# Patient Record
Sex: FEMALE | Race: BLACK OR AFRICAN AMERICAN | NOT HISPANIC OR LATINO | Employment: UNEMPLOYED | URBAN - METROPOLITAN AREA
[De-identification: names, ages, dates, MRNs, and addresses within clinical notes are randomized per-mention and may not be internally consistent; named-entity substitution may affect disease eponyms.]

---

## 2021-04-15 ENCOUNTER — PATIENT MESSAGE (OUTPATIENT)
Dept: RESEARCH | Facility: HOSPITAL | Age: 21
End: 2021-04-15

## 2021-07-01 ENCOUNTER — PATIENT MESSAGE (OUTPATIENT)
Dept: ADMINISTRATIVE | Facility: OTHER | Age: 21
End: 2021-07-01

## 2022-04-20 ENCOUNTER — HOSPITAL ENCOUNTER (OUTPATIENT)
Facility: HOSPITAL | Age: 22
Discharge: HOME | End: 2022-04-22
Attending: EMERGENCY MEDICINE | Admitting: EMERGENCY MEDICINE
Payer: MEDICAID

## 2022-04-20 DIAGNOSIS — T78.3XXA ANGIOEDEMA: Primary | ICD-10-CM

## 2022-04-20 LAB
B-HCG UR QL: NEGATIVE
BASOPHILS # BLD AUTO: 0.03 K/UL (ref 0–0.2)
BASOPHILS NFR BLD: 0.2 % (ref 0–1.9)
CTP QC/QA: YES
CTP QC/QA: YES
DIFFERENTIAL METHOD: ABNORMAL
EOSINOPHIL # BLD AUTO: 0 K/UL (ref 0–0.5)
EOSINOPHIL NFR BLD: 0.1 % (ref 0–8)
ERYTHROCYTE [DISTWIDTH] IN BLOOD BY AUTOMATED COUNT: 12.5 % (ref 11.5–14.5)
HCT VFR BLD AUTO: 33.5 % (ref 37–48.5)
HGB BLD-MCNC: 10.9 G/DL (ref 12–16)
IMM GRANULOCYTES # BLD AUTO: 0.05 K/UL (ref 0–0.04)
IMM GRANULOCYTES NFR BLD AUTO: 0.4 % (ref 0–0.5)
LYMPHOCYTES # BLD AUTO: 2.1 K/UL (ref 1–4.8)
LYMPHOCYTES NFR BLD: 17.3 % (ref 18–48)
MCH RBC QN AUTO: 23.6 PG (ref 27–31)
MCHC RBC AUTO-ENTMCNC: 32.5 G/DL (ref 32–36)
MCV RBC AUTO: 73 FL (ref 82–98)
MONOCYTES # BLD AUTO: 0.2 K/UL (ref 0.3–1)
MONOCYTES NFR BLD: 1.7 % (ref 4–15)
NEUTROPHILS # BLD AUTO: 9.7 K/UL (ref 1.8–7.7)
NEUTROPHILS NFR BLD: 80.3 % (ref 38–73)
NRBC BLD-RTO: 0 /100 WBC
PLATELET # BLD AUTO: 358 K/UL (ref 150–450)
PMV BLD AUTO: 9.4 FL (ref 9.2–12.9)
RBC # BLD AUTO: 4.61 M/UL (ref 4–5.4)
SARS-COV-2 RDRP RESP QL NAA+PROBE: NEGATIVE
WBC # BLD AUTO: 12.12 K/UL (ref 3.9–12.7)

## 2022-04-20 PROCEDURE — 96372 THER/PROPH/DIAG INJ SC/IM: CPT | Performed by: PHYSICIAN ASSISTANT

## 2022-04-20 PROCEDURE — 96375 TX/PRO/DX INJ NEW DRUG ADDON: CPT

## 2022-04-20 PROCEDURE — 85025 COMPLETE CBC W/AUTO DIFF WBC: CPT | Performed by: PHYSICIAN ASSISTANT

## 2022-04-20 PROCEDURE — 63600175 PHARM REV CODE 636 W HCPCS: Performed by: PHYSICIAN ASSISTANT

## 2022-04-20 PROCEDURE — 86140 C-REACTIVE PROTEIN: CPT | Performed by: PHYSICIAN ASSISTANT

## 2022-04-20 PROCEDURE — 96361 HYDRATE IV INFUSION ADD-ON: CPT

## 2022-04-20 PROCEDURE — 81025 URINE PREGNANCY TEST: CPT | Performed by: PHYSICIAN ASSISTANT

## 2022-04-20 PROCEDURE — 80053 COMPREHEN METABOLIC PANEL: CPT | Performed by: PHYSICIAN ASSISTANT

## 2022-04-20 PROCEDURE — 25000003 PHARM REV CODE 250: Performed by: PHYSICIAN ASSISTANT

## 2022-04-20 PROCEDURE — 96374 THER/PROPH/DIAG INJ IV PUSH: CPT

## 2022-04-20 PROCEDURE — 86160 COMPLEMENT ANTIGEN: CPT | Performed by: PHYSICIAN ASSISTANT

## 2022-04-20 PROCEDURE — 86160 COMPLEMENT ANTIGEN: CPT | Mod: 59 | Performed by: PHYSICIAN ASSISTANT

## 2022-04-20 PROCEDURE — U0002 COVID-19 LAB TEST NON-CDC: HCPCS | Performed by: PHYSICIAN ASSISTANT

## 2022-04-20 PROCEDURE — 99285 EMERGENCY DEPT VISIT HI MDM: CPT | Mod: 25

## 2022-04-20 PROCEDURE — 83735 ASSAY OF MAGNESIUM: CPT | Performed by: PHYSICIAN ASSISTANT

## 2022-04-20 PROCEDURE — 96376 TX/PRO/DX INJ SAME DRUG ADON: CPT

## 2022-04-20 PROCEDURE — G0378 HOSPITAL OBSERVATION PER HR: HCPCS

## 2022-04-20 RX ORDER — FAMOTIDINE 10 MG/ML
20 INJECTION INTRAVENOUS
Status: COMPLETED | OUTPATIENT
Start: 2022-04-20 | End: 2022-04-20

## 2022-04-20 RX ORDER — METHYLPREDNISOLONE SOD SUCC 125 MG
125 VIAL (EA) INJECTION ONCE
Status: DISCONTINUED | OUTPATIENT
Start: 2022-04-21 | End: 2022-04-21

## 2022-04-20 RX ORDER — DIPHENHYDRAMINE HYDROCHLORIDE 50 MG/ML
25 INJECTION INTRAMUSCULAR; INTRAVENOUS
Status: COMPLETED | OUTPATIENT
Start: 2022-04-20 | End: 2022-04-20

## 2022-04-20 RX ORDER — EPINEPHRINE 0.3 MG/.3ML
0.3 INJECTION SUBCUTANEOUS
Status: COMPLETED | OUTPATIENT
Start: 2022-04-20 | End: 2022-04-20

## 2022-04-20 RX ORDER — METHYLPREDNISOLONE SOD SUCC 125 MG
125 VIAL (EA) INJECTION
Status: COMPLETED | OUTPATIENT
Start: 2022-04-20 | End: 2022-04-20

## 2022-04-20 RX ORDER — FAMOTIDINE 10 MG/ML
40 INJECTION INTRAVENOUS ONCE
Status: DISCONTINUED | OUTPATIENT
Start: 2022-04-21 | End: 2022-04-21

## 2022-04-20 RX ADMIN — SODIUM CHLORIDE, SODIUM LACTATE, POTASSIUM CHLORIDE, AND CALCIUM CHLORIDE 1000 ML: .6; .31; .03; .02 INJECTION, SOLUTION INTRAVENOUS at 09:04

## 2022-04-20 RX ADMIN — EPINEPHRINE 0.3 MG: 0.3 INJECTION INTRAMUSCULAR at 08:04

## 2022-04-20 RX ADMIN — FAMOTIDINE 20 MG: 10 INJECTION INTRAVENOUS at 07:04

## 2022-04-20 RX ADMIN — DIPHENHYDRAMINE HYDROCHLORIDE 25 MG: 50 INJECTION INTRAMUSCULAR; INTRAVENOUS at 09:04

## 2022-04-20 RX ADMIN — METHYLPREDNISOLONE SODIUM SUCCINATE 125 MG: 125 INJECTION, POWDER, FOR SOLUTION INTRAMUSCULAR; INTRAVENOUS at 07:04

## 2022-04-20 RX ADMIN — DIPHENHYDRAMINE HYDROCHLORIDE 25 MG: 50 INJECTION INTRAMUSCULAR; INTRAVENOUS at 07:04

## 2022-04-21 LAB
ABO + RH BLD: NORMAL
ALBUMIN SERPL BCP-MCNC: 3.3 G/DL (ref 3.5–5.2)
ALP SERPL-CCNC: 39 U/L (ref 55–135)
ALT SERPL W/O P-5'-P-CCNC: 8 U/L (ref 10–44)
ANION GAP SERPL CALC-SCNC: 9 MMOL/L (ref 8–16)
AST SERPL-CCNC: 14 U/L (ref 10–40)
BILIRUB SERPL-MCNC: 0.2 MG/DL (ref 0.1–1)
BLD GP AB SCN CELLS X3 SERPL QL: NORMAL
BLD PROD TYP BPU: NORMAL
BLD PROD TYP BPU: NORMAL
BLOOD UNIT EXPIRATION DATE: NORMAL
BLOOD UNIT EXPIRATION DATE: NORMAL
BLOOD UNIT TYPE CODE: 5100
BLOOD UNIT TYPE CODE: 5100
BLOOD UNIT TYPE: NORMAL
BLOOD UNIT TYPE: NORMAL
BUN SERPL-MCNC: 10 MG/DL (ref 6–20)
C4 SERPL-MCNC: 31 MG/DL (ref 11–44)
CALCIUM SERPL-MCNC: 9.2 MG/DL (ref 8.7–10.5)
CHLORIDE SERPL-SCNC: 108 MMOL/L (ref 95–110)
CO2 SERPL-SCNC: 20 MMOL/L (ref 23–29)
CODING SYSTEM: NORMAL
CODING SYSTEM: NORMAL
CREAT SERPL-MCNC: 1.1 MG/DL (ref 0.5–1.4)
CRP SERPL-MCNC: 2.2 MG/L (ref 0–8.2)
DISPENSE STATUS: NORMAL
DISPENSE STATUS: NORMAL
ERYTHROCYTE [SEDIMENTATION RATE] IN BLOOD BY WESTERGREN METHOD: 19 MM/HR (ref 0–20)
EST. GFR  (AFRICAN AMERICAN): >60 ML/MIN/1.73 M^2
EST. GFR  (NON AFRICAN AMERICAN): >60 ML/MIN/1.73 M^2
GLUCOSE SERPL-MCNC: 160 MG/DL (ref 70–110)
MAGNESIUM SERPL-MCNC: 1.6 MG/DL (ref 1.6–2.6)
NUM UNITS TRANS FFP: NORMAL
NUM UNITS TRANS FFP: NORMAL
POTASSIUM SERPL-SCNC: 3.9 MMOL/L (ref 3.5–5.1)
PROT SERPL-MCNC: 8.1 G/DL (ref 6–8.4)
SODIUM SERPL-SCNC: 137 MMOL/L (ref 136–145)

## 2022-04-21 PROCEDURE — G0378 HOSPITAL OBSERVATION PER HR: HCPCS

## 2022-04-21 PROCEDURE — P9017 PLASMA 1 DONOR FRZ W/IN 8 HR: HCPCS | Performed by: EMERGENCY MEDICINE

## 2022-04-21 PROCEDURE — 25000003 PHARM REV CODE 250: Performed by: HOSPITALIST

## 2022-04-21 PROCEDURE — 85652 RBC SED RATE AUTOMATED: CPT | Performed by: PHYSICIAN ASSISTANT

## 2022-04-21 PROCEDURE — 86850 RBC ANTIBODY SCREEN: CPT | Performed by: EMERGENCY MEDICINE

## 2022-04-21 PROCEDURE — 25000003 PHARM REV CODE 250: Performed by: EMERGENCY MEDICINE

## 2022-04-21 PROCEDURE — 94761 N-INVAS EAR/PLS OXIMETRY MLT: CPT

## 2022-04-21 PROCEDURE — 96376 TX/PRO/DX INJ SAME DRUG ADON: CPT

## 2022-04-21 PROCEDURE — 63600175 PHARM REV CODE 636 W HCPCS: Performed by: EMERGENCY MEDICINE

## 2022-04-21 PROCEDURE — 36430 TRANSFUSION BLD/BLD COMPNT: CPT

## 2022-04-21 PROCEDURE — 86900 BLOOD TYPING SEROLOGIC ABO: CPT | Performed by: EMERGENCY MEDICINE

## 2022-04-21 RX ORDER — ACETAMINOPHEN 325 MG/1
650 TABLET ORAL EVERY 4 HOURS PRN
Status: DISCONTINUED | OUTPATIENT
Start: 2022-04-21 | End: 2022-04-21

## 2022-04-21 RX ORDER — HYDROCODONE BITARTRATE AND ACETAMINOPHEN 500; 5 MG/1; MG/1
TABLET ORAL
Status: DISCONTINUED | OUTPATIENT
Start: 2022-04-21 | End: 2022-04-21

## 2022-04-21 RX ORDER — DIPHENHYDRAMINE HCL 25 MG
25 CAPSULE ORAL EVERY 8 HOURS
Status: DISCONTINUED | OUTPATIENT
Start: 2022-04-21 | End: 2022-04-22 | Stop reason: HOSPADM

## 2022-04-21 RX ORDER — METHYLPREDNISOLONE SOD SUCC 125 MG
125 VIAL (EA) INJECTION EVERY 8 HOURS
Status: DISCONTINUED | OUTPATIENT
Start: 2022-04-21 | End: 2022-04-21

## 2022-04-21 RX ORDER — PREDNISONE 20 MG/1
40 TABLET ORAL DAILY
Status: DISCONTINUED | OUTPATIENT
Start: 2022-04-22 | End: 2022-04-22 | Stop reason: HOSPADM

## 2022-04-21 RX ORDER — ONDANSETRON 2 MG/ML
4 INJECTION INTRAMUSCULAR; INTRAVENOUS EVERY 8 HOURS PRN
Status: DISCONTINUED | OUTPATIENT
Start: 2022-04-21 | End: 2022-04-22 | Stop reason: HOSPADM

## 2022-04-21 RX ORDER — TALC
6 POWDER (GRAM) TOPICAL NIGHTLY PRN
Status: DISCONTINUED | OUTPATIENT
Start: 2022-04-21 | End: 2022-04-22 | Stop reason: HOSPADM

## 2022-04-21 RX ORDER — ACETAMINOPHEN 325 MG/1
650 TABLET ORAL EVERY 4 HOURS PRN
Status: DISCONTINUED | OUTPATIENT
Start: 2022-04-21 | End: 2022-04-22 | Stop reason: HOSPADM

## 2022-04-21 RX ORDER — SODIUM CHLORIDE 0.9 % (FLUSH) 0.9 %
10 SYRINGE (ML) INJECTION
Status: DISCONTINUED | OUTPATIENT
Start: 2022-04-21 | End: 2022-04-22 | Stop reason: HOSPADM

## 2022-04-21 RX ORDER — FAMOTIDINE 10 MG/ML
40 INJECTION INTRAVENOUS DAILY
Status: DISCONTINUED | OUTPATIENT
Start: 2022-04-22 | End: 2022-04-22 | Stop reason: HOSPADM

## 2022-04-21 RX ADMIN — DIPHENHYDRAMINE HYDROCHLORIDE 25 MG: 25 CAPSULE ORAL at 02:04

## 2022-04-21 RX ADMIN — METHYLPREDNISOLONE SODIUM SUCCINATE 125 MG: 125 INJECTION, POWDER, FOR SOLUTION INTRAMUSCULAR; INTRAVENOUS at 02:04

## 2022-04-21 RX ADMIN — METHYLPREDNISOLONE SODIUM SUCCINATE 125 MG: 125 INJECTION, POWDER, FOR SOLUTION INTRAMUSCULAR; INTRAVENOUS at 05:04

## 2022-04-21 RX ADMIN — ACETAMINOPHEN 650 MG: 325 TABLET ORAL at 11:04

## 2022-04-21 NOTE — CARE UPDATE
Second bag of FFP not spiked upon start of shift.  Verified information by two RNs and started infusion at 0736.

## 2022-04-21 NOTE — ED NOTES
Lip swelling has increased. Denies shortness of breath or trouble swallowing. No airway compromise. Provider notified and at bedside.

## 2022-04-21 NOTE — PLAN OF CARE
Patient continues with swollen upper lip---feels lip has not changed in size since beginning of shift.   Patient is able to swallow without difficulty.   Does complain of headache relieved with tylenol.   Family at bedside.

## 2022-04-21 NOTE — H&P
[facility]  [facility] Medicine  History & Physical    Patient Name: Henna Ruiz  MRN: [id number]  Patient Class: OP- Observation  Admission Date: 4/20/2022  Attending Physician: Piyush Anderson MD  Primary Care Provider: Dulce Owens MD (Inactive)         Patient information was obtained from patient, past medical records and ER records.     Subjective:     Principal Problem:Angioedema    Chief Complaint:       Chief Complaint   Patient presents with    Facial Swelling     Pt presents to ED c/o right sided lip swelling x 2 hr pta.  Associated symptom tingling and numbness.  Denies any difficult swallowing, throat swelling, new medication, facial product, sob or cp.  Pain 0/10.  Provider seen pt in triage.         HPI: 21 y.o. female PMHx iron deficiency anemia on ferrous sulfate and recently completed dose of fluconazole presents with lip swelling that started all of sudden at 5:30pm.  She states she had a pretty routine day and did not eat anything different or out of the ordinary.  She states she completed a course of fluconazole 2 days ago.  There has been no soaps, perfumes, detergent or environment. She is not on antihypertensives.  She denies trauma, rash, SOB, dizziness.  She states that her heart started to race after being given epinephrine in the emergency department.    Per report, patient has a grandfather who had similar symptoms years ago.    ED course: Patient had the following  Medications   famotidine (PF) injection 20 mg (20 mg Intravenous Given 4/20/22 1947)   diphenhydrAMINE injection 25 mg (25 mg Intravenous Given 4/20/22 1948)   methylPREDNISolone sodium succinate injection 125 mg (125 mg Intravenous Given 4/20/22 1949)   EPINEPHrine (EPIPEN) 0.3 mg/0.3 mL pen injection 0.3 mg (0.3 mg Intramuscular Given 4/20/22 2056)   lactated ringers bolus 1,000 mL (1,000 mLs Intravenous New Bag 4/20/22 2157)   diphenhydrAMINE injection 25 mg (25 mg Intravenous Given 4/20/22 2157)   She  "has noted worsening swelling since arriving in the ED. Denies hives or SOB. Anesthesia evaluated her and did not believe she needed to be immediately intubated.      Admitted to Hospital Medicine for further evaluation    Past Medical History:   Diagnosis Date    Bronchitis     Eczema     Sickle cell trait      No past surgical history on file.  Social History     Tobacco Use    Smoking status: Never Smoker    Smokeless tobacco: Never Used   Substance Use Topics    Alcohol use: No    Drug use: No     No family history on file.  Review of patient's allergies indicates:  No Known Allergies    MEDICATION (includes but may not be exclusive to:)    ferrous gluconate (FERGON) 325 MG Tab, Take 1 tablet (325 mg total) by mouth once daily., Disp: 90 tablet, Rfl: 6  fluconazole       Review of Systems as per HPI  Review of Systems   Respiratory: Negative for cough, hemoptysis, sputum production, shortness of breath and wheezing.    Cardiovascular: Negative for chest pain, orthopnea, claudication, leg swelling and PND.   Gastrointestinal: Negative for abdominal pain, constipation, diarrhea, nausea and vomiting.   Musculoskeletal: Negative.    Skin: Negative.    Neurological: Negative for dizziness, tingling, tremors, sensory change, speech change, focal weakness, seizures, loss of consciousness, weakness and headaches.   Endo/Heme/Allergies: Negative for environmental allergies.   All other systems reviewed and are negative.        Physical Exam     ED Triage Vitals [04/20/22 1935]   Enc Vitals Group      /84      Pulse 82      Resp 17      Temp 99.1 °F (37.3 °C)      Temp src Oral      SpO2 100 %      Weight 138 lb      Height 5' 5"      Head Circumference       Peak Flow       Pain Score       Pain Loc       Pain Edu?       Excl. in GC?      Vitals:    04/20/22 1935 04/20/22 2132   BP: 135/84 123/62   BP Location: Right arm    Patient Position: Sitting    Pulse: 82 103   Resp: 17    Temp: 99.1 °F (37.3 °C)  " "  TempSrc: Oral    SpO2: 100% 100%   Weight: 62.6 kg (138 lb)    Height: 5' 5" (1.651 m)        Vital signs and nursing assessment noted: vitals relatively benign    GEN:   NAD, A & Ox3, atraumatic, well appearing, nontoxic appearing  HEENT:  PERRLA, EOMI, moist membranes, nl conjunctiva, no scleral icterus, no nystagmus, no nodes/nodules, soft, supple, FROM, no trachial deviation, nexus negative  Physical Exam  HENT:      Mouth/Throat:      Mouth: Angioedema present.         CV:   RRR no m/r/g, 2+ radial pulses, <2sec cap refill, no obvious JVD  RESP:  CTA B, no w/r/r, equal and bilateral chest rise, no respiratory distress  ABD:   soft, Nontender, Nondistended, +BS, no guarding/rebound  :   Deferred  BACK:  FROM, no midline tenderness, no paraspinal tenderness  EXT:   FROM, BEE x 4, no edema, no swelling, no calf tenderness, no bony tenderness, no warmth or redness, no crepitus, no obvious deformity  LYMPH:  no gross adenopathy  NEURO:  GCS 15, CN II-XII grossly intact, no obvious motor/sensory deficit, no tremor, negative Romberg,  nl gait/coordination  PSYCH:   no SI/HI, no anxiety, nl mood/affect, nl judgement/thought process  SKIN:  Warm, dry, intact, no rashes/lesions or masses, nl color, no pallor       Tests   Significant Labs and/or Imaging: I have reviewed all pertinent results/findings within the past 24 hours.  Labs Reviewed   CBC W/ AUTO DIFFERENTIAL - Abnormal; Notable for the following components:       Result Value    Hemoglobin 10.9 (*)     Hematocrit 33.5 (*)     MCV 73 (*)     MCH 23.6 (*)     Gran # (ANC) 9.7 (*)     Immature Grans (Abs) 0.05 (*)     Mono # 0.2 (*)     Gran % 80.3 (*)     Lymph % 17.3 (*)     Mono % 1.7 (*)     All other components within normal limits   COMPREHENSIVE METABOLIC PANEL   C1 ESTERASE INHIBITOR PANEL   MAGNESIUM   C4 COMPLEMENT   C-REACTIVE PROTEIN   SEDIMENTATION RATE   POCT URINE PREGNANCY   SARS-COV-2 RDRP GENE   TYPE & SCREEN     No orders to display     No " results found for this or any previous visit.      Assessment/Plan:     * Angioedema  Isolated angioedema without immediate evidence of airway compromise  Angioedema of the lips or mouth sometimes spreads to involve the throat, and frequent monitoring of airway patency is critical throughout treatment.   --  C-reactive protein (CRP), erythrocyte sedimentation rate (ESR), levels of the complement protein C4 and type/screen ordered  --- Consider FFP  --- Patient should be admitted for hospital observation services under the care of Dr. Anderson. Observe until there are clear signs of improvement to ensure that the angioedema is not the beginning of a more generalized allergic reaction that is still in evolution.           Iron deficiency anemia  Monitor H/H      VTE Risk Mitigation (From admission, onward)         Ordered     IP VTE LOW RISK PATIENT  Once         04/21/22 0017                 Patient placed on continuous cardiac monitor, automatic blood pressure cuff and continuous pulse oximeter.  Critical care was necessary to treat or prevent imminent or life-threatening deterioration.   Critical care time: 31 min  Time spent at the bedside, reviewing test results, discussing the case with staff and/or consult(s), documenting the medical record   The time involved in the performance of separately reportable procedures was not counted toward critical care time.        David Roca MD  Department of Hospital Medicine   [facility] - Emergency Dept

## 2022-04-21 NOTE — PROVIDER TRANSFER
Transfer Note    20 y/o female presented with lip swelling.  Significant swelling of upper lip consistent with angioedema.  Started on steroids, famotidine and benadryl.  FFP also given by [facility].  Angioedema possibly secondary to Diflucan.  Upper lip still significantly swollen, but patient with no airway compromise, sats of 100% on RA and tolerating diet.  Would monitor overnight and hopefully home tomorrow.  Needs Allergy referral upon discharge.

## 2022-04-21 NOTE — ASSESSMENT & PLAN NOTE
Significant swelling of upper lip, but no airway compromise.  No trouble swallowing.  Hemodynamically stable and doing well on RA.  Possibly secondary to Diflucan patient had over the weekend for yeast infection.  Will place Diflucan on allergy list.  Needs referral to Allergist.  Started on steroids, benadryl and famotidine.  Would monitor overnight and hopefully home tomorrow.

## 2022-04-21 NOTE — PROGRESS NOTES
[facility] Medicine  Progress Note    Patient Name: Henna Ruiz  MRN: [id number]  Patient Class: OP- Observation   Admission Date: 4/20/2022  Length of Stay: 0 days  Attending Physician: Jose Guadalupe Anderson MD  Primary Care Provider: Dulce Owens MD (Inactive)        Subjective:     Principal Problem:Angioedema        HPI:  No notes on file    Overview/Hospital Course:  20 y/o female presented with lip swelling.  Significant swelling of upper lip consistent with angioedema.  Started on steroids, famotidine and benadryl.  Angioedema possibly secondary to Diflucan.      Interval History: Still with significant swelling of upper lip, but no trouble breathing or swallowing.    Review of Systems   HENT:  Negative for ear discharge and ear pain.    Eyes:  Negative for discharge and itching.   Endocrine: Negative for cold intolerance and heat intolerance.   Neurological:  Negative for seizures and syncope.   Objective:     Vital Signs (Most Recent):  Temp: 98.1 °F (36.7 °C) (04/21/22 0945)  Pulse: 100 (04/21/22 1300)  Resp: 19 (04/21/22 1300)  BP: 106/60 (04/21/22 1300)  SpO2: 100 % (04/21/22 1300) Vital Signs (24h Range):  Temp:  [98.1 °F (36.7 °C)-99.1 °F (37.3 °C)] 98.1 °F (36.7 °C)  Pulse:  [] 100  Resp:  [17-39] 19  SpO2:  [93 %-100 %] 100 %  BP: ()/(51-84) 106/60     Weight: 63.5 kg (139 lb 15.9 oz)  Body mass index is 23.3 kg/m².    Intake/Output Summary (Last 24 hours) at 4/21/2022 1325  Last data filed at 4/21/2022 0945  Gross per 24 hour   Intake 458 ml   Output --   Net 458 ml      Physical Exam  Constitutional:       Appearance: She is ill-appearing. She is not diaphoretic.   HENT:      Head: Normocephalic and atraumatic.      Mouth/Throat:      Comments: Significant swelling of upper lip.  No tongue swelling.  Eyes:      General:         Right eye: No discharge.         Left eye: No discharge.      Conjunctiva/sclera: Conjunctivae normal.   Cardiovascular:      Rate and  Rhythm: Normal rate and regular rhythm.   Pulmonary:      Breath sounds: Normal breath sounds. No wheezing.   Abdominal:      General: Bowel sounds are normal.      Palpations: Abdomen is soft.   Musculoskeletal:         General: No deformity or signs of injury.   Skin:     General: Skin is warm and dry.   Neurological:      Mental Status: She is oriented to person, place, and time.      Cranial Nerves: No cranial nerve deficit.       Significant Labs: All pertinent labs within the past 24 hours have been reviewed.  BMP:   Recent Labs   Lab 04/20/22  2200   *      K 3.9      CO2 20*   BUN 10   CREATININE 1.1   CALCIUM 9.2   MG 1.6     CBC:   Recent Labs   Lab 04/20/22  2200   WBC 12.12   HGB 10.9*   HCT 33.5*          Significant Imaging: I have reviewed all pertinent imaging results/findings within the past 24 hours.      Assessment/Plan:      * Angioedema  Significant swelling of upper lip, but no airway compromise.  No trouble swallowing.  Hemodynamically stable and doing well on RA.  Possibly secondary to Diflucan patient had over the weekend for yeast infection.  Will place Diflucan on allergy list.  Needs referral to Allergist.  Started on steroids, benadryl and famotidine.  Would monitor overnight and hopefully home tomorrow.          Iron deficiency anemia  No evidence of bleeding.  Stable.        VTE Risk Mitigation (From admission, onward)         Ordered     IP VTE LOW RISK PATIENT  Once         04/21/22 0017                Discharge Planning   SANDY:      Code Status: Full Code   Is the patient medically ready for discharge?:     Reason for patient still in hospital (select all that apply): Patient trending condition  Discharge Plan A: Home            Jose Guadalupe Anderson MD  Department of Hospital Medicine   [facility] - [place] - Intensive Care

## 2022-04-21 NOTE — PLAN OF CARE
[place] - Intensive Care  Discharge Assessment    Primary Care Provider: Dulce Owens MD (Inactive)     Discharge Assessment (most recent)       BRIEF DISCHARGE ASSESSMENT - 04/21/22 1033          Discharge Planning    Assessment Type Discharge Planning Brief Assessment (P)      Resource/Environmental Concerns none (P)      Support Systems Parent;Family members (P)      Assistance Needed None (P)      Equipment Currently Used at Home none (P)      Current Living Arrangements home/apartment/condo (P)      Patient/Family Anticipates Transition to home (P)      Patient/Family Anticipated Services at Transition none (P)      DME Needed Upon Discharge  none (P)      Discharge Plan A Home (P)      Discharge Plan B Home (P)

## 2022-04-21 NOTE — SUBJECTIVE & OBJECTIVE
Interval History: Still with significant swelling of upper lip, but no trouble breathing or swallowing.    Review of Systems   HENT:  Negative for ear discharge and ear pain.    Eyes:  Negative for discharge and itching.   Endocrine: Negative for cold intolerance and heat intolerance.   Neurological:  Negative for seizures and syncope.   Objective:     Vital Signs (Most Recent):  Temp: 98.1 °F (36.7 °C) (04/21/22 0945)  Pulse: 100 (04/21/22 1300)  Resp: 19 (04/21/22 1300)  BP: 106/60 (04/21/22 1300)  SpO2: 100 % (04/21/22 1300) Vital Signs (24h Range):  Temp:  [98.1 °F (36.7 °C)-99.1 °F (37.3 °C)] 98.1 °F (36.7 °C)  Pulse:  [] 100  Resp:  [17-39] 19  SpO2:  [93 %-100 %] 100 %  BP: ()/(51-84) 106/60     Weight: 63.5 kg (139 lb 15.9 oz)  Body mass index is 23.3 kg/m².    Intake/Output Summary (Last 24 hours) at 4/21/2022 1325  Last data filed at 4/21/2022 0945  Gross per 24 hour   Intake 458 ml   Output --   Net 458 ml      Physical Exam  Constitutional:       Appearance: She is ill-appearing. She is not diaphoretic.   HENT:      Head: Normocephalic and atraumatic.      Mouth/Throat:      Comments: Significant swelling of upper lip.  No tongue swelling.  Eyes:      General:         Right eye: No discharge.         Left eye: No discharge.      Conjunctiva/sclera: Conjunctivae normal.   Cardiovascular:      Rate and Rhythm: Normal rate and regular rhythm.   Pulmonary:      Breath sounds: Normal breath sounds. No wheezing.   Abdominal:      General: Bowel sounds are normal.      Palpations: Abdomen is soft.   Musculoskeletal:         General: No deformity or signs of injury.   Skin:     General: Skin is warm and dry.   Neurological:      Mental Status: She is oriented to person, place, and time.      Cranial Nerves: No cranial nerve deficit.       Significant Labs: All pertinent labs within the past 24 hours have been reviewed.  BMP:   Recent Labs   Lab 04/20/22  2200   *      K 3.9       CO2 20*   BUN 10   CREATININE 1.1   CALCIUM 9.2   MG 1.6     CBC:   Recent Labs   Lab 04/20/22  2200   WBC 12.12   HGB 10.9*   HCT 33.5*          Significant Imaging: I have reviewed all pertinent imaging results/findings within the past 24 hours.

## 2022-04-21 NOTE — NURSING
Pt top lip noticiably larger in size.  at bedside to assess patient. Plan to transfuse FFP. Blood consent signed.

## 2022-04-21 NOTE — PROGRESS NOTES
[facility] Progress NOTE      Admit Date: 2022  LOS: 0  Code Status: Full Code   Date of Consult: 2022  : 2000  Age: 21 y.o.  Weight:   Wt Readings from Last 1 Encounters:   22 63.5 kg (139 lb 15.9 oz)     Sex: female  Bed: W270/W270 A:   MRN: [id number]  Attending Physician: Jose Guadalupe Anderson MD  Primary Service: Networked reference to record PCT              Called by RN for increased size of upper lip.  Patient denies HIVs or SOB. Originally admitted for Angioedema, with the following known comorbidities:   Past Medical History:   Diagnosis Date    Bronchitis     Eczema     Sickle cell trait        PHYSICAL EXAM  Vital Signs (Most Recent):  Temp: 98.9 °F (37.2 °C) (22 0147)  Pulse: 80 (22 0300)  Resp: 19 (22 0300)  BP: 107/66 (22 0300)  SpO2: 100 % (22 0300) Vital Signs (24h Range):  Temp:  [98.8 °F (37.1 °C)-99.1 °F (37.3 °C)] 98.9 °F (37.2 °C)  Pulse:  [] 80  Resp:  [17-21] 19  SpO2:  [99 %-100 %] 100 %  BP: (107-135)/(62-84) 107/66      Physical Exam  HENT:      Mouth/Throat:      Mouth: Angioedema (more prominent upper lip) present.   Pulmonary:      Effort: No tachypnea, bradypnea, accessory muscle usage, prolonged expiration, respiratory distress or retractions.   Skin:     Coloration: Skin is not ashen, cyanotic, jaundiced, mottled, pale or sallow.      Findings: Rash is not urticarial.         Tests      Latest Reference Range & Units 22 22:00   Sodium 136 - 145 mmol/L 137   Potassium 3.5 - 5.1 mmol/L 3.9   Chloride 95 - 110 mmol/L 108   CO2 23 - 29 mmol/L 20 (L)   Anion Gap 8 - 16 mmol/L 9   BUN 6 - 20 mg/dL 10   Creatinine 0.5 - 1.4 mg/dL 1.1   EGFR if non African American >60 mL/min/1.73 m^2 >60 [1]   EGFR if African American >60 mL/min/1.73 m^2 >60   Glucose 70 - 110 mg/dL 160 (H)   Calcium 8.7 - 10.5 mg/dL 9.2   Magnesium 1.6 - 2.6 mg/dL 1.6   Alkaline Phosphatase 55 - 135 U/L 39 (L)   PROTEIN TOTAL 6.0 - 8.4 g/dL 8.1   Albumin  3.5 - 5.2 g/dL 3.3 (L)   BILIRUBIN TOTAL 0.1 - 1.0 mg/dL 0.2 [2]   AST 10 - 40 U/L 14   ALT 10 - 44 U/L 8 (L)   CRP 0.0 - 8.2 mg/L 2.2      Latest Reference Range & Units 04/21/22 01:03   Sed Rate 0 - 20 mm/Hr 19     X-Ray Chest AP Portable   Final Result      No radiographic evidence of acute intrathoracic process on this single view.         Electronically signed by: Yo Cummings MD   Date:    04/21/2022   Time:    03:12            ASSESSMENT/PLAN:   Lip swelling more likely angioedema than allergic reaction or trauma    Worsening symptoms despite antihistamine, steriods and epi.    FFP ordered.    Patient placed on continuous cardiac monitor, automatic blood pressure cuff and continuous pulse oximeter.      Will continue frequent clinical reassessment including vital signs

## 2022-04-21 NOTE — EICU
I reviewed the patient through the camera in    Review the chart talked with the nurse   the patient had sudden swelling of her upper lip only the lower lip is not affected patient looks very comfortable at the time being has no difficulty of breathing or any distress she is under close observation she was given steroid and famotidine will continue very close observation    I will order chest x-ray for completion sake    ?  Angioedema

## 2022-04-21 NOTE — ASSESSMENT & PLAN NOTE
Isolated angioedema without immediate evidence of airway compromise  Angioedema of the lips or mouth sometimes spreads to involve the throat, and frequent monitoring of airway patency is critical throughout treatment.   --  C-reactive protein (CRP), erythrocyte sedimentation rate (ESR), levels of the complement protein C4 and type/screen ordered  --- Consider FFP  --- Patient should be admitted for hospital observation services under the care of Dr. Anderson. Observe until there are clear signs of improvement to ensure that the angioedema is not the beginning of a more generalized allergic reaction that is still in evolution.

## 2022-04-21 NOTE — NURSING
Patient in [id number]. Connected to CCM, NSR on monitor, VSS, afebrile. Localized swelling to top lip. Pt states her lip has gotten bigger since being in ER. No tongue swelling noted. Pt in NAD at this time. Mother at bedside. Will continue to closely monitor.

## 2022-04-21 NOTE — CARE UPDATE
[facility]  Nurses Note -- 4 Eyes      4/21/2022       Skin assessed on: Q Shift      [x] No Pressure Injuries Present    []Prevention Measures Documented    [] Yes LDA Previously documented     [] Yes New Pressure Injury Discovered   [] LDA Added      Attending RN:  Chela Walton, RN     Second RN:  Christin Charlton RN

## 2022-04-21 NOTE — ED NOTES
Pt presents to ED secondary to right sided lip swelling x 2 hr. Associated symptom is tingling and numbness. Denies any difficult swallowing, throat swelling, new medication, facial product, sob or cp. Pain 0/10. Denies taking meds pta. NAD noted.

## 2022-04-21 NOTE — ED PROVIDER NOTES
Encounter Date: 4/20/2022       History     Chief Complaint   Patient presents with    Facial Swelling     Pt presents to ED c/o right sided lip swelling x 2 hr pta.  Associated symptom tingling and numbness.  Denies any difficult swallowing, throat swelling, new medication, facial product, sob or cp.  Pain 0/10.  Provider seen pt in triage.       20yo F with acute onset L upper lip swelling, numbness approx 5:30pm today.    No hx anaphylaxis. No hx similar allergic reactions.  No tongue swelling.  No odynophagia or dysphagia.  No chest tightness, no shortness of breath, no nausea vomiting, no lightheadedness.  No facial rash.  No trauma.  No neck pain or stiffness.  Denies any rash.    Not on lisinopril.  No new medications.  No new known exposures.  No meds taken prior to arrival.    Possible  family history of angioedema.    PMH:  LUH  Sickle cell trait    Rx: contraception?        Review of patient's allergies indicates:  No Known Allergies  Past Medical History:   Diagnosis Date    Bronchitis     Eczema     Sickle cell trait      No past surgical history on file.  No family history on file.  Social History     Tobacco Use    Smoking status: Never Smoker    Smokeless tobacco: Never Used   Substance Use Topics    Alcohol use: No    Drug use: No     Review of Systems   Constitutional: Negative for fever.   HENT: Positive for facial swelling. Negative for sore throat and trouble swallowing.    Respiratory: Negative for chest tightness and shortness of breath.    Gastrointestinal: Negative for nausea and vomiting.   Musculoskeletal: Negative for neck pain and neck stiffness.   Neurological: Negative for light-headedness.   Hematological: Negative for adenopathy.       Physical Exam     Initial Vitals [04/20/22 1935]   BP Pulse Resp Temp SpO2   135/84 82 17 99.1 °F (37.3 °C) 100 %      MAP       --         Physical Exam    Nursing note and vitals reviewed.  Constitutional: She appears well-developed and  well-nourished. She is not diaphoretic. No distress.   HENT:   Head: Normocephalic and atraumatic.   L upper lip edema, nontender. No overlying facial rash. No intraoral edema. Airway widely patent. Mucous membranes moist. Normal phonation.   Neck: Neck supple.   No stridor.   Normal range of motion.  Pulmonary/Chest: No respiratory distress.   Musculoskeletal:      Cervical back: Normal range of motion and neck supple.     Neurological: She is alert and oriented to person, place, and time. GCS score is 15. GCS eye subscore is 4. GCS verbal subscore is 5. GCS motor subscore is 6.   Skin: Skin is warm. Capillary refill takes less than 2 seconds.   Psychiatric: She has a normal mood and affect. Thought content normal.         ED Course   Procedures  Labs Reviewed   CBC W/ AUTO DIFFERENTIAL - Abnormal; Notable for the following components:       Result Value    Hemoglobin 10.9 (*)     Hematocrit 33.5 (*)     MCV 73 (*)     MCH 23.6 (*)     Gran # (ANC) 9.7 (*)     Immature Grans (Abs) 0.05 (*)     Mono # 0.2 (*)     Gran % 80.3 (*)     Lymph % 17.3 (*)     Mono % 1.7 (*)     All other components within normal limits   COMPREHENSIVE METABOLIC PANEL   C1 ESTERASE INHIBITOR PANEL   MAGNESIUM   C4 COMPLEMENT   POCT URINE PREGNANCY   SARS-COV-2 RDRP GENE          Imaging Results    None          Medications   famotidine (PF) injection 20 mg (20 mg Intravenous Given 4/20/22 1947)   diphenhydrAMINE injection 25 mg (25 mg Intravenous Given 4/20/22 1948)   methylPREDNISolone sodium succinate injection 125 mg (125 mg Intravenous Given 4/20/22 1949)   EPINEPHrine (EPIPEN) 0.3 mg/0.3 mL pen injection 0.3 mg (0.3 mg Intramuscular Given 4/20/22 2056)   lactated ringers bolus 1,000 mL (1,000 mLs Intravenous New Bag 4/20/22 2157)   diphenhydrAMINE injection 25 mg (25 mg Intravenous Given 4/20/22 2157)     Medical Decision Making:   Differential Diagnosis:   Angioedema, local allergic reaction, anaphylaxis, cellulitis  ED  Management:  Will give H1 H2 blockers along with systemic corticosteroids, monitor in the ED given suspicion for angioedema vs allergic reaction involving upper mouth/face.             ED Course as of 04/20/22 2326 Wed Apr 20, 2022 2046 On reevaluation, worsening L upper lip edema. Approx 1hr since H1, H2, solumedrol. Called and spoke with , she personally evaluated patient. No airway involvement at this time. Will move to main ED for cardiac monitoring given epipen.  [SM]   2150 On re-evaluation, continues with worsening left upper lip edema.  Again, airway is widely patent, no obvious intraoral involvement, no stridor, no shortness of breath, no chest tightness, vitals remain reassuring.  Likely observation overnight due to persistent swelling. [SM]   2322 On re-evaluation, continues with worsening swelling, now involves her right upper lip.  After talking with her mother, apparently patient's grandfather also had a similar facial swelling episode.  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 [SM]      ED Course User Index  [SM] Alex Reyes PA-C             Clinical Impression:   Final diagnoses:  [T78.3XXA] Angioedema (Primary)          ED Disposition Condition    Observation               Alex Reyes PA-C  04/20/22 2326

## 2022-04-21 NOTE — HOSPITAL COURSE
21 y.o. female PMHx iron deficiency anemia on ferrous sulfate admitted to observation for lip swelling consistent with angioedema. Of note, patient recently completed Diflucan for vaginal yeast infection on Sunday 04/17 and developed lip swelling 2-3 days later. Suspect angioedema due to diflucan. She was started on steroids, famotidine and benadryl. Patient was monitored closely in the ICU and stepped down to floor on 04/21.     Patient admits significant improvement and upper lip swelling however it is still present.  Able to smile and show her teeth now, unlike before.  Denies any numbness or tingling in her lips.  Denies any tongue swelling, throat swelling, or any difficulty breathing.  Denies any changes in her speech.  Mom at bedside and admits there improvement in her lip swelling. Pt denies any fever, headaches, vision changes, chest pain, shortness of breath, palpitations, abdominal pain, nausea, vomiting, or any new weaknesses. Patient's exam on discharge was as follow: Patient is alert and oriented, appears in no acute distress, heart with regular rate and rhythm, lungs clear to asculation with non-labored breathing, abdomen soft, and no new weaknesses or focal deficits seen. Patient continues with lip swelling of her upper lip, but no airway compromise. No trouble swallowing. Throat exam unremarkable. Able to visualize uvula. No tongue swelling on exam. Hemodynamically stable and doing well on RA.  Speech is very understandable.  Neck range of motion appears normal.    Patient was counseled regarding any abnormal labs, differential diagnosis, treatment options, risk-benefit, lifestyle changes, prognosis, current condition, and medications. Patient was interactive and attentive.  Patient's questions were answered in a respectful and timely manner. Patient was instructed to follow-up with PCP within 1 week and to continue taking medications as prescribed.  Referral to allergist ordered. Also, extensively  discussed the risks, benefits, and side effects of patient's medications. Discussed with patient about any medication changes. Patient verbalized understanding and agrees to treatment plan.  Patient is stable for discharge.  Patient has no other questions or concerns at this time.  ED precautions discussed with the patient.    Vital signs are stable. Ambulating without any difficulty. Tolerating p.o. intake without any nausea or vomiting. Afebrile for over 24 hours. Patient is in stable condition and has no questions or concerns. Patient will be discharge to home. New medications sent to pharmacy. Again, discussed strict ED precautions with patient and her mother.

## 2022-04-21 NOTE — ASSESSMENT & PLAN NOTE
Significant swelling of upper lip, but no airway compromise.  No trouble swallowing.  Hemodynamically stable and doing well on RA.  Possibly secondary to Diflucan patient had over the weekend for yeast infection.  Will place Diflucan on allergy list.  Needs referral to Allergist.  Started on steroids, benadryl and famotidine.  Patient also given FFP.  Would monitor overnight and hopefully home tomorrow.

## 2022-04-21 NOTE — CARE UPDATE
[facility]  ICU Shift Summary  Date: 4/21/2022      Prehospitalization: Home  Admit Date / LOS : 4/20/2022/ 0 days    Diagnosis: Angioedema    Consults:        Active: N/A       Needed: N/A     Code Status: Full Code   Advanced Directive: <no information>    LDA:  Lines/Drains/Airways     Peripheral Intravenous Line  Duration                Peripheral IV - Single Lumen 04/20/22 1947 20 G Right Antecubital <1 day              Central Lines/Site/Justification:Patient Does Not Have Central Line  Urinary Cath/Order/Justification:Patient Does Not Have Urinary Catheter    Vasopressors/Infusions:        GOALS: Volume/ Hemodynamic: N/A                     RASS: 0  alert and calm    Pain Management: PO       Pain Controlled: yes     Rhythm: NSR    Respiratory Device: Room Air                  Most Recent SBT/ SAT: N/A       MOVE Screen: PASS  ICU Liberation: not applicable    VTE Prophylaxis: Mechanical  Mobility: Ambulatory  Stress Ulcer Prophylaxis: Yes    Isolation: No active isolations    Dietary:   Current Diet Order   Procedures    Diet Adult Regular (IDDSI Level 7)      Tolerance: yes  Advancement: @ goal    I & O (24h):    Intake/Output Summary (Last 24 hours) at 4/21/2022 1629  Last data filed at 4/21/2022 1300  Gross per 24 hour   Intake 578 ml   Output --   Net 578 ml        Restraints: No    Significant Dates:  Post Op Date: N/A  Rescue Date: N/A  Imaging/ Diagnostics: N/A    Noteworthy Labs:  none    COVID Test: (--)  CBC/Anemia Labs: Coags:    Recent Labs   Lab 04/20/22  2200   WBC 12.12   HGB 10.9*   HCT 33.5*      MCV 73*   RDW 12.5    No results for input(s): PT, INR, APTT in the last 168 hours.     Chemistries:   Recent Labs   Lab 04/20/22  2200      K 3.9      CO2 20*   BUN 10   CREATININE 1.1   CALCIUM 9.2   PROT 8.1   BILITOT 0.2   ALKPHOS 39*   ALT 8*   AST 14   MG 1.6        Cardiac Enzymes: Ejection Fractions:    No results for input(s): CPK, CPKMB, MB, TROPONINI in  the last 72 hours. No results found for: EF     POCT Glucose: HbA1c:    No results for input(s): POCTGLUCOSE in the last 168 hours. No results found for: HGBA1C        ICU LOS 14h  Level of Care: OK to Transfer    Chart Check: 12 HR Done  Shift Summary/Plan for the shift: see care plan note

## 2022-04-22 VITALS
DIASTOLIC BLOOD PRESSURE: 52 MMHG | HEIGHT: 65 IN | OXYGEN SATURATION: 98 % | HEART RATE: 88 BPM | WEIGHT: 140 LBS | BODY MASS INDEX: 23.32 KG/M2 | RESPIRATION RATE: 18 BRPM | TEMPERATURE: 99 F | SYSTOLIC BLOOD PRESSURE: 91 MMHG

## 2022-04-22 PROCEDURE — 25000003 PHARM REV CODE 250: Performed by: HOSPITALIST

## 2022-04-22 PROCEDURE — G0378 HOSPITAL OBSERVATION PER HR: HCPCS

## 2022-04-22 PROCEDURE — 96376 TX/PRO/DX INJ SAME DRUG ADON: CPT

## 2022-04-22 PROCEDURE — 63600175 PHARM REV CODE 636 W HCPCS: Performed by: HOSPITALIST

## 2022-04-22 RX ORDER — EPINEPHRINE 0.3 MG/.3ML
1 INJECTION SUBCUTANEOUS ONCE AS NEEDED
Qty: 2 EACH | Refills: 0 | Status: SHIPPED | OUTPATIENT
Start: 2022-04-22 | End: 2022-04-23

## 2022-04-22 RX ORDER — DIPHENHYDRAMINE HCL 25 MG
25 TABLET ORAL EVERY 6 HOURS PRN
Qty: 20 TABLET | Refills: 0 | Status: SHIPPED | OUTPATIENT
Start: 2022-04-22 | End: 2022-04-27

## 2022-04-22 RX ORDER — CETIRIZINE HYDROCHLORIDE 10 MG/1
10 TABLET ORAL DAILY
Qty: 30 TABLET | Refills: 0 | Status: SHIPPED | OUTPATIENT
Start: 2022-04-22 | End: 2022-05-22

## 2022-04-22 RX ORDER — PREDNISONE 20 MG/1
40 TABLET ORAL DAILY
Qty: 10 TABLET | Refills: 0 | Status: SHIPPED | OUTPATIENT
Start: 2022-04-23 | End: 2022-04-28

## 2022-04-22 RX ADMIN — FAMOTIDINE 40 MG: 10 INJECTION INTRAVENOUS at 09:04

## 2022-04-22 RX ADMIN — DIPHENHYDRAMINE HYDROCHLORIDE 25 MG: 25 CAPSULE ORAL at 12:04

## 2022-04-22 RX ADMIN — DIPHENHYDRAMINE HYDROCHLORIDE 25 MG: 25 CAPSULE ORAL at 05:04

## 2022-04-22 RX ADMIN — PREDNISONE 40 MG: 20 TABLET ORAL at 09:04

## 2022-04-22 NOTE — NURSING
SBAR report called to Nurse Travis on Med Surg 4E. All questions answered. Preparing for transport. VSS. Pt in NAD at this time.

## 2022-04-22 NOTE — PLAN OF CARE
04/22/22 1113   Final Note   Assessment Type Final Discharge Note   Anticipated Discharge Disposition Home   Hospital Resources/Appts/Education Provided Community resources provided   Post-Acute Status   Post-Acute Authorization Other   Other Status No Post-Acute Service Needs   Pts nurse Sweta notified that the pt can d/c from CM standpoint

## 2022-04-22 NOTE — DISCHARGE SUMMARY
[facility]  Discharge Summary      Patient Name: Henna Ruiz  MRN: [id number]  Patient Class: OP- Observation  Admission Date: 4/20/2022  Hospital Length of Stay: 0 days  Discharge Date and Time: 4/22/2022  1:43 PM  Attending Physician: No att. providers found   Discharging Provider: Prasanna Malin DO  Primary Care Provider: Dulce Owens MD (Inactive)      HPI:   No notes on file    * No surgery found *      Hospital Course:   21 y.o. female PMHx iron deficiency anemia on ferrous sulfate admitted to observation for lip swelling consistent with angioedema. Of note, patient recently completed Diflucan for vaginal yeast infection on Sunday 04/17 and developed lip swelling 2-3 days later. Suspect angioedema due to diflucan. She was started on steroids, famotidine and benadryl. Patient was monitored closely in the ICU and stepped down to floor on 04/21.     Patient admits significant improvement and upper lip swelling however it is still present.  Able to smile and show her teeth now, unlike before.  Denies any numbness or tingling in her lips.  Denies any tongue swelling, throat swelling, or any difficulty breathing.  Denies any changes in her speech.  Mom at bedside and admits there improvement in her lip swelling. Pt denies any fever, headaches, vision changes, chest pain, shortness of breath, palpitations, abdominal pain, nausea, vomiting, or any new weaknesses. Patient's exam on discharge was as follow: Patient is alert and oriented, appears in no acute distress, heart with regular rate and rhythm, lungs clear to asculation with non-labored breathing, abdomen soft, and no new weaknesses or focal deficits seen. Patient continues with lip swelling of her upper lip, but no airway compromise. No trouble swallowing. Throat exam unremarkable. Able to visualize uvula. No tongue swelling on exam. Hemodynamically stable and doing well on RA.  Speech is very understandable.  Neck range of  motion appears normal.    Patient was counseled regarding any abnormal labs, differential diagnosis, treatment options, risk-benefit, lifestyle changes, prognosis, current condition, and medications. Patient was interactive and attentive.  Patient's questions were answered in a respectful and timely manner. Patient was instructed to follow-up with PCP within 1 week and to continue taking medications as prescribed.  Referral to allergist ordered. Also, extensively discussed the risks, benefits, and side effects of patient's medications. Discussed with patient about any medication changes. Patient verbalized understanding and agrees to treatment plan.  Patient is stable for discharge.  Patient has no other questions or concerns at this time.  ED precautions discussed with the patient.    Vital signs are stable. Ambulating without any difficulty. Tolerating p.o. intake without any nausea or vomiting. Afebrile for over 24 hours. Patient is in stable condition and has no questions or concerns. Patient will be discharge to home. New medications sent to pharmacy. Again, discussed strict ED precautions with patient and her mother.          Goals of Care Treatment Preferences:  Code Status: Full Code      Consults:     * Angioedema  Significant swelling of upper lip, but no airway compromise.  No trouble swallowing.  Hemodynamically stable and doing well on RA.  Possibly secondary to Diflucan patient had over the weekend for yeast infection.  Will place Diflucan on allergy list.  Needs referral to Allergist.  Started on steroids, benadryl and famotidine.  Patient also given FFP.  Would monitor overnight and hopefully home tomorrow.            Final Active Diagnoses:    Diagnosis Date Noted POA    PRINCIPAL PROBLEM:  Angioedema [T78.3XXA] 04/20/2022 Yes    Iron deficiency anemia [D50.9] 01/14/2014 Yes      Problems Resolved During this Admission:       Discharged Condition: stable    Disposition: Home or Self Care    Follow  Up:   Follow-up Information     [facility] [place] [place] Follow up.    Why: please call at time of discharge to schedule follow up and establish care for future medical needs  Contact information:  [address] [place] [id number] [phone]                       Patient Instructions:      Ambulatory referral/consult to Allergy   Standing Status: Future   Referral Priority: Routine Referral Type: Allergy Testing   Referral Reason: Specialty Services Required   Requested Specialty: Allergy   Number of Visits Requested: 1     Diet Adult Regular     Notify your health care provider if you experience any of the following:  severe uncontrolled pain     Notify your health care provider if you experience any of the following:  redness, tenderness, or signs of infection (pain, swelling, redness, odor or green/yellow discharge around incision site)     Notify your health care provider if you experience any of the following:  difficulty breathing or increased cough     Notify your health care provider if you experience any of the following:  worsening rash     Notify your health care provider if you experience any of the following:  persistent dizziness, light-headedness, or visual disturbances     Notify your health care provider if you experience any of the following:  increased confusion or weakness     Activity as tolerated       Significant Diagnostic Studies:   Imaging Results    None           Pending Diagnostic Studies:     Procedure Component Value Units Date/Time    C1 esterase inhibitor panel [[id number]] Collected: 04/20/22 2214    Order Status: Sent Lab Status: In process Updated: 04/21/22 1445    Specimen: Blood          Medications:  Reconciled Home Medications:      Medication List      START taking these medications    BANOPHEN 25 mg tablet  Generic drug: diphenhydrAMINE  Take 1 tablet (25 mg total) by mouth every 6 (six) hours as needed for Itching (swelling).     cetirizine 10 MG tablet  Commonly  known as: ZYRTEC  Take 1 tablet (10 mg total) by mouth once daily.     EPINEPHrine 0.3 mg/0.3 mL Atin  Commonly known as: AUVI-Q  Inject 0.3 mLs (0.3 mg total) into the muscle once as needed (anaphylaxis).     predniSONE 20 MG tablet  Commonly known as: DELTASONE  Take 2 tablets (40 mg total) by mouth once daily. for 5 days  Start taking on: April 23, 2022        CONTINUE taking these medications    ferrous gluconate 325 MG Tab  Commonly known as: FERGON  Take 1 tablet (325 mg total) by mouth once daily.            Indwelling Lines/Drains at time of discharge:   Lines/Drains/Airways     None                 Time spent on the discharge of patient: Greater than 35 minutes         Prasanna Malin DO  Department of Hospital Medicine  [facility] Surg

## 2022-04-22 NOTE — NURSING
Pt Oriented x4. Alert and pleasant. No shortness of breath or chest pain. Pt doesn't complain of pain at this time will continue to monitor. Patient remains free from falls and hospital acquired injury/ illness. NAD. VSS. Medications given this shift per MD order. Care plan explained throughout shift and reinforced. Bed is locked and in low position. Call light within reach.

## 2022-04-22 NOTE — PLAN OF CARE
Problem: Adult Inpatient Plan of Care  Goal: Plan of Care Review  Outcome: Ongoing, Progressing  Goal: Patient-Specific Goal (Individualized)  Outcome: Ongoing, Progressing  Goal: Absence of Hospital-Acquired Illness or Injury  Outcome: Ongoing, Progressing  Intervention: Identify and Manage Fall Risk  Flowsheets (Taken 4/22/2022 0247)  Safety Promotion/Fall Prevention:   assistive device/personal item within reach   bed alarm refused  Intervention: Prevent Skin Injury  Flowsheets (Taken 4/22/2022 0247)  Skin Protection: adhesive use limited  Intervention: Prevent and Manage VTE (Venous Thromboembolism) Risk  Flowsheets (Taken 4/22/2022 0247)  Activity Management: Ambulated -L4  VTE Prevention/Management:   ambulation promoted   fluids promoted

## 2022-04-22 NOTE — PROGRESS NOTES
WRITTEN HEALTHCARE DISCHARGE INFORMATION      Things that YOU are RESPONSIBLE for to Manage Your Care At Home:     1. Getting your prescriptions filled.  2. Taking you medications as directed. DO NOT MISS ANY DOSES!  3. Going to your follow-up doctor appointments. This is important because it allows the doctor to monitor your progress and to determine if any changes need to be made to your treatment plan.     If you are unable to make your follow up appointments, please call the number listed and reschedule this appointment.      ____________HELP AT HOME____________________     Experiencing any SIGNS or SYMPTOMS: YOU CAN     Schedule a same day appopintment with your Primary Care Doctor or  you can call [facility] On Call Nurse Care Line for 24/7 assistance at [phone]     If you are experience any signs or symptoms that have become severe, Call 911 and come to your nearest Emergency Room.     Thank you for choosing [facility] and allowing us to care for you.   From your care management team:      You should receive a call from [facility] Discharge Department within 48-72 hours to help manage your care after discharge. Please try to make sure that you answer your phone for this important phone call.     Follow-up Information     [facility] Follow up.    Why: please call at time of discharge to schedule follow up and establish care for future medical needs  Contact information:  [address] [place] [address]  [phone]

## 2022-04-23 LAB — C1INH SERPL-MCNC: 22 MG/DL (ref 21–39)

## 2025-05-05 PROCEDURE — 86480 TB TEST CELL IMMUN MEASURE: CPT | Performed by: EMERGENCY MEDICINE
